# Patient Record
Sex: MALE | Race: AMERICAN INDIAN OR ALASKA NATIVE
[De-identification: names, ages, dates, MRNs, and addresses within clinical notes are randomized per-mention and may not be internally consistent; named-entity substitution may affect disease eponyms.]

---

## 2019-01-15 ENCOUNTER — HOSPITAL ENCOUNTER (EMERGENCY)
Dept: HOSPITAL 5 - ED | Age: 34
Discharge: HOME | End: 2019-01-15
Payer: SELF-PAY

## 2019-01-15 VITALS — SYSTOLIC BLOOD PRESSURE: 149 MMHG | DIASTOLIC BLOOD PRESSURE: 80 MMHG

## 2019-01-15 DIAGNOSIS — R50.9: ICD-10-CM

## 2019-01-15 DIAGNOSIS — M79.10: ICD-10-CM

## 2019-01-15 DIAGNOSIS — F17.200: ICD-10-CM

## 2019-01-15 DIAGNOSIS — L02.31: Primary | ICD-10-CM

## 2019-01-15 LAB
ALBUMIN SERPL-MCNC: 3.7 G/DL (ref 3.9–5)
ALT SERPL-CCNC: 12 UNITS/L (ref 7–56)
BASOPHILS # (AUTO): 0 K/MM3 (ref 0–0.1)
BASOPHILS NFR BLD AUTO: 0.3 % (ref 0–1.8)
BILIRUB UR QL STRIP: (no result)
BLOOD UR QL VISUAL: (no result)
BUN SERPL-MCNC: 11 MG/DL (ref 9–20)
BUN/CREAT SERPL: 12 %
CALCIUM SERPL-MCNC: 9 MG/DL (ref 8.4–10.2)
EOSINOPHIL # BLD AUTO: 0.1 K/MM3 (ref 0–0.4)
EOSINOPHIL NFR BLD AUTO: 1.1 % (ref 0–4.3)
HCT VFR BLD CALC: 36.7 % (ref 35.5–45.6)
HEMOLYSIS INDEX: 40
HGB BLD-MCNC: 12.5 GM/DL (ref 11.8–15.2)
LYMPHOCYTES # BLD AUTO: 1.9 K/MM3 (ref 1.2–5.4)
LYMPHOCYTES NFR BLD AUTO: 15.1 % (ref 13.4–35)
MCHC RBC AUTO-ENTMCNC: 34 % (ref 32–34)
MCV RBC AUTO: 92 FL (ref 84–94)
MONOCYTES # (AUTO): 1.4 K/MM3 (ref 0–0.8)
MONOCYTES % (AUTO): 11.7 % (ref 0–7.3)
MUCOUS THREADS #/AREA URNS HPF: (no result) /HPF
PH UR STRIP: 6 [PH] (ref 5–7)
PLATELET # BLD: 338 K/MM3 (ref 140–440)
PROT UR STRIP-MCNC: (no result) MG/DL
RBC # BLD AUTO: 4.01 M/MM3 (ref 3.65–5.03)
RBC #/AREA URNS HPF: 2 /HPF (ref 0–6)
UROBILINOGEN UR-MCNC: < 2 MG/DL (ref ?–2)
WBC #/AREA URNS HPF: 4 /HPF (ref 0–6)

## 2019-01-15 PROCEDURE — 85025 COMPLETE CBC W/AUTO DIFF WBC: CPT

## 2019-01-15 PROCEDURE — 82140 ASSAY OF AMMONIA: CPT

## 2019-01-15 PROCEDURE — 81001 URINALYSIS AUTO W/SCOPE: CPT

## 2019-01-15 PROCEDURE — 80053 COMPREHEN METABOLIC PANEL: CPT

## 2019-01-15 PROCEDURE — 99283 EMERGENCY DEPT VISIT LOW MDM: CPT

## 2019-01-15 PROCEDURE — 36415 COLL VENOUS BLD VENIPUNCTURE: CPT

## 2019-01-15 NOTE — EMERGENCY DEPARTMENT REPORT
ED General Adult HPI





- General


Chief complaint: Fever


Stated complaint: FEVER AND CHILLS


Time Seen by Provider: 01/15/19 20:59


Source: patient


Mode of arrival: Ambulatory


Limitations: No Limitations





- History of Present Illness


Initial comments: 





33-year-old -American male comes in for complaint of fever chills body 

aches 1 week.  Patient reports he had a boil to his left buttocks that started 

draining prior to his fever.  She reports he's been taking aspirin for fever 

reducer last doses at 1700.  Patient reports his never had a history of boils he

has no past medical history currently takes no medications on a daily basis has 

no known drug allergies no primary care provider.


-: week(s) (1)


Location: buttocks (left buttocks), left


Quality: aching


Consistency: intermittent


Improves with: none


Associated Symptoms: fever/chills


Treatments Prior to Arrival: Aspirin (last dose at 1700)





- Related Data


                                  Previous Rx's











 Medication  Instructions  Recorded  Last Taken  Type


 


Ibuprofen [Motrin 600 MG tab] 600 mg PO Q8H PRN #60 tablet 08/15/15 Unknown Rx


 


traMADol [Ultram 50 MG tab] 50 mg PO Q6HR PRN #20 tablet 08/15/15 Unknown Rx


 


Cephalexin [Keflex] 500 mg PO BID #20 capsule 01/15/19 Unknown Rx


 


Ibuprofen [Motrin 600 MG tab] 600 mg PO Q8H PRN #15 tablet 01/15/19 Unknown Rx


 


Sulfamethoxazole/Trimethoprim 1 each PO BID #20 tablet 01/15/19 Unknown Rx





[Bactrim DS TAB]    











                                    Allergies











Allergy/AdvReac Type Severity Reaction Status Date / Time


 


No Known Allergies Allergy   Unverified 08/14/15 21:20














ED Review of Systems


ROS: 


Stated complaint: FEVER AND CHILLS


Other details as noted in HPI





Comment: All other systems reviewed and negative


Constitutional: chills, fever


Musculoskeletal: myalgia


Skin: lesions





ED Past Medical Hx





- Past Medical History


Previous Medical History?: No





- Surgical History


Past Surgical History?: No





- Social History


Smoking Status: Current Every Day Smoker


Substance Use Type: Alcohol





- Medications


Home Medications: 


                                Home Medications











 Medication  Instructions  Recorded  Confirmed  Last Taken  Type


 


Ibuprofen [Motrin 600 MG tab] 600 mg PO Q8H PRN #60 tablet 08/15/15  Unknown Rx


 


traMADol [Ultram 50 MG tab] 50 mg PO Q6HR PRN #20 tablet 08/15/15  Unknown Rx


 


Cephalexin [Keflex] 500 mg PO BID #20 capsule 01/15/19  Unknown Rx


 


Ibuprofen [Motrin 600 MG tab] 600 mg PO Q8H PRN #15 tablet 01/15/19  Unknown Rx


 


Sulfamethoxazole/Trimethoprim 1 each PO BID #20 tablet 01/15/19  Unknown Rx





[Bactrim DS TAB]     














ED Physical Exam





- General


Limitations: No Limitations


General appearance: alert, in no apparent distress





- Head


Head exam: Present: atraumatic, normocephalic





- Eye


Eye exam: Present: EOMI





- ENT


ENT exam: Present: mucous membranes moist





- Neck


Neck exam: Present: normal inspection





- Respiratory


Respiratory exam: Present: normal lung sounds bilaterally.  Absent: respiratory 

distress





- Cardiovascular


Cardiovascular Exam: Present: regular rate, normal rhythm.  Absent: systolic 

murmur, diastolic murmur, rubs, gallop





- Extremities Exam


Extremities exam: Present: full ROM





- Neurological Exam


Neurological exam: Present: alert, oriented X3





- Psychiatric


Psychiatric exam: Present: normal affect, normal mood





- Expanded Skin Exam


  ** Expanded


Type of lesion: Present: abscess


Distribution of rash: other (left buttocks)


Description of rash: Present: indurated.  Absent: discharge, fluctuant





ED Course


                                   Vital Signs











  01/15/19 01/15/19





  20:03 21:25


 


Temperature 98.7 F 101.0 F H


 


Pulse Rate 86 


 


Respiratory 18 





Rate  


 


Blood Pressure 149/80 


 


O2 Sat by Pulse 100 





Oximetry  














ED Medical Decision Making





- Lab Data


Result diagrams: 


                                 01/15/19 21:26





                                 01/15/19 21:26





- Medical Decision Making





Patient has been evaluated by this provider in fast track.


Patient had elevated temperature and this provider evaluated patient and it was 

101.0 by mouth.


Patient has had CBC CMP lactic acid and urinalysis.


CBC has mild elevation of WBCs.


Patient has a left buttocks abscess that is non-fluctuant.


This provider were placed patient on Keflex and Bactrim with ibuprofen for pain 

management and fevers.  Patient be referred to primary care provider for follow-

up.  Patient verbalizes understanding.


Critical care attestation.: 


If time is entered above; I have spent that time in minutes in the direct care 

of this critically ill patient, excluding procedure time.








ED Disposition


Clinical Impression: 


 Left buttock abscess





Disposition: DC-01 TO HOME OR SELFCARE


Is pt being admited?: No


Does the pt Need Aspirin: No


Condition: Stable


Instructions:  Abscess (ED)


Additional Instructions: 


Please complete antibiotic and pain medication as needed.  Follow-up with 

primary care provider.


Prescriptions: 


Cephalexin [Keflex] 500 mg PO BID #20 capsule


Ibuprofen [Motrin 600 MG tab] 600 mg PO Q8H PRN #15 tablet


 PRN Reason: Pain


Sulfamethoxazole/Trimethoprim [Bactrim DS TAB] 1 each PO BID #20 tablet


Referrals: 


JULIANNA RODRIGUEZ MD [Primary Care Provider] - 3-5 Days


[facility] [Provider Group] - 3-5 Days


Forms:  Work/School Release Form(ED)